# Patient Record
Sex: FEMALE | Race: BLACK OR AFRICAN AMERICAN | Employment: UNEMPLOYED | URBAN - METROPOLITAN AREA
[De-identification: names, ages, dates, MRNs, and addresses within clinical notes are randomized per-mention and may not be internally consistent; named-entity substitution may affect disease eponyms.]

---

## 2021-07-03 ENCOUNTER — HOSPITAL ENCOUNTER (EMERGENCY)
Age: 31
Discharge: HOME | End: 2021-07-03
Payer: MEDICAID

## 2021-07-03 VITALS
DIASTOLIC BLOOD PRESSURE: 82 MMHG | TEMPERATURE: 96.9 F | BODY MASS INDEX: 25.69 KG/M2 | WEIGHT: 145 LBS | HEART RATE: 74 BPM | OXYGEN SATURATION: 100 % | SYSTOLIC BLOOD PRESSURE: 118 MMHG | RESPIRATION RATE: 16 BRPM | HEIGHT: 63 IN

## 2021-07-03 DIAGNOSIS — Z02.89 ENCOUNTER TO OBTAIN EXCUSE FROM WORK: Primary | ICD-10-CM

## 2021-07-03 PROCEDURE — 99283 EMERGENCY DEPT VISIT LOW MDM: CPT

## 2021-07-03 NOTE — ED PROVIDER NOTES
[address] [facility]  Department of Emergency Medicine   ED  Encounter Note  Admit Date/RoomTime: 7/3/2021  2:03 PM  ED Room: SRAVAN/SRAVAN  NAME: Jackie Avilez  : 1990  MRN: [id number]     Chief Complaint:  Other (Diarrhea and nausea x 3 days after eating [employer]. Feels better today. Needs an excuse to return to work. )    [facility]        Jackie Avilez is a 31 y.o. female who presents to the ED by private vehicle for work excuse. Patient states she ate at [facility] on Wed night and Thursday morning she developed multiple episodes of watery non bloody diarrhea which improved on Friday and is drinking gatorade and coffee today with no further events. She wants to return to work tonight as a  and needs an excuse to return. She denies any fever, chills, abdominal pain or UTI symptoms. The complaint has been resolved and are mild in severity. ROS   Pertinent positives and negatives are stated within HPI, all other systems reviewed and are negative. Past Medical History:  has no past medical history on file. Surgical History:  has no past surgical history on file. Social History:  reports that she has never smoked. She does not have any smokeless tobacco history on file. She reports that she does not drink alcohol and does not use drugs. Family History: family history is not on file. Allergies: Patient has no known allergies. PHYSICAL EXAM   Oxygen Saturation Interpretation: Normal.        ED Triage Vitals [21 1322]   BP Temp Temp Source Pulse Resp SpO2 Height Weight   118/82 96.9 °F (36.1 °C) Infrared 74 16 100 % 5' 3\" (1.6 m) 145 lb (65.8 kg)         Physical Exam  Constitutional/General: Alert and oriented x3, well appearing, non toxic  HEENT:  NC/NT. PERRLA,  Airway patent.   Neck: Supple, full ROM, non tender to palpation in the midline, no stridor, no crepitus, no meningeal signs  Respiratory: Lungs clear to auscultation bilaterally, no wheezes, rales, or rhonchi. Not in respiratory distress  CV:  Regular rate. Regular rhythm. No murmurs, gallops, or rubs. 2+ distal pulses  Chest: No chest wall tenderness  GI:  Abdomen Soft, Non tender, Non distended. +BS. No rebound, guarding, or rigidity. No pulsatile masses. Musculoskeletal: Moves all extremities x 4. Warm and well perfused, no clubbing, cyanosis, or edema. Capillary refill <3 seconds  Integument: skin warm and dry. No rashes. Lymphatic: no lymphadenopathy noted  Neurologic: GCS 15, no focal deficits, symmetric strength 5/5 in the upper and lower extremities bilaterally  Psychiatric: Normal Affect    Lab / Imaging Results   (All laboratory and radiology results have been personally reviewed by myself)  Labs:  No results found for this visit on 07/03/21. Imaging: All Radiology results interpreted by Radiologist unless otherwise noted. No orders to display       ED Course / Medical Decision Making   Medications - No data to display     . Consult(s):   None    Procedure(s):   none    MDM:   This is a 48-year-old female who presents today and has no symptoms and wanting a work excuse to return back to work today because he called off work a few days ago after eating at [employer] and experienced some gastrointestinal symptoms which have completely resolved. She denies any URI symptoms. She is afebrile, nontoxic appearance, hemodynamically stable has a benign abdominal examination has no urinary complaints or vaginal concerns. Patient was advised on follow-up with her PCP as needed. Work excuse will be provided to return to work this evening. Plan of Care/Counseling:  EDDIE Foreman CNP reviewed today's visit with the patient in addition to providing specific details for the plan of care and counseling regarding the diagnosis and prognosis. Questions are answered at this time and are agreeable with the plan. ASSESSMENT     1.  Encounter to obtain excuse from work      PLAN   Discharged home. Patient condition is good    New Medications     Discharge Medication List as of 7/3/2021  3:00 PM        Electronically signed by EDDIE Winters CNP   DD: 7/3/21  **This report was transcribed using voice recognition software. Every effort was made to ensure accuracy; however, inadvertent computerized transcription errors may be present.   END OF ED PROVIDER NOTE      EDDIE Winters CNP  07/06/21 [id number]

## 2021-07-23 ENCOUNTER — APPOINTMENT (OUTPATIENT)
Dept: GENERAL RADIOLOGY | Age: 31
End: 2021-07-23
Payer: MEDICAID

## 2021-07-23 ENCOUNTER — APPOINTMENT (OUTPATIENT)
Dept: CT IMAGING | Age: 31
End: 2021-07-23
Payer: MEDICAID

## 2021-07-23 ENCOUNTER — HOSPITAL ENCOUNTER (EMERGENCY)
Age: 31
Discharge: HOME | End: 2021-07-23
Payer: MEDICAID

## 2021-07-23 VITALS
SYSTOLIC BLOOD PRESSURE: 136 MMHG | HEART RATE: 75 BPM | TEMPERATURE: 98.1 F | BODY MASS INDEX: 24.8 KG/M2 | OXYGEN SATURATION: 98 % | WEIGHT: 140 LBS | RESPIRATION RATE: 16 BRPM | DIASTOLIC BLOOD PRESSURE: 74 MMHG | HEIGHT: 63 IN

## 2021-07-23 DIAGNOSIS — T07.XXXA ABRASIONS OF MULTIPLE SITES: Primary | ICD-10-CM
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
## 2021-07-23 PROCEDURE — 73502 X-RAY EXAM HIP UNI 2-3 VIEWS: CPT

## 2021-07-23 PROCEDURE — 72100 X-RAY EXAM L-S SPINE 2/3 VWS: CPT

## 2021-07-23 PROCEDURE — 73630 X-RAY EXAM OF FOOT: CPT

## 2021-07-23 PROCEDURE — 99283 EMERGENCY DEPT VISIT LOW MDM: CPT

## 2021-07-23 PROCEDURE — 72125 CT NECK SPINE W/O DYE: CPT

## 2021-07-23 PROCEDURE — 70450 CT HEAD/BRAIN W/O DYE: CPT

## 2021-07-23 PROCEDURE — 73560 X-RAY EXAM OF KNEE 1 OR 2: CPT

## 2021-07-23 RX ORDER — BACITRACIN ZINC AND POLYMYXIN B SULFATE 500; 1000 [USP'U]/G; [USP'U]/G
OINTMENT TOPICAL
Qty: 30 G | Refills: 0 | Status: SHIPPED | OUTPATIENT
Start: 2021-07-23 | End: 2021-07-30

## 2021-07-23 RX ORDER — IBUPROFEN 600 MG/1
600 TABLET ORAL ONCE
Status: DISCONTINUED | OUTPATIENT
Start: 2021-07-23 | End: 2021-07-23 | Stop reason: HOSPADM

## 2021-07-23 RX ORDER — DIAPER,BRIEF,INFANT-TODD,DISP
EACH MISCELLANEOUS
Status: DISCONTINUED
Start: 2021-07-23 | End: 2021-07-23 | Stop reason: HOSPADM

## 2021-07-23 RX ORDER — IBUPROFEN 800 MG/1
800 TABLET ORAL EVERY 6 HOURS PRN
Qty: 30 TABLET | Refills: 0 | Status: SHIPPED | OUTPATIENT
Start: 2021-07-23 | End: 2022-01-04 | Stop reason: SDUPTHER

## 2021-07-23 RX ORDER — DIAPER,BRIEF,INFANT-TODD,DISP
EACH MISCELLANEOUS ONCE
Status: DISCONTINUED | OUTPATIENT
Start: 2021-07-23 | End: 2021-07-23 | Stop reason: HOSPADM

## 2021-07-23 RX ORDER — CEPHALEXIN 500 MG/1
500 CAPSULE ORAL 3 TIMES DAILY
Qty: 21 CAPSULE | Refills: 0 | Status: SHIPPED | OUTPATIENT
Start: 2021-07-23 | End: 2021-07-30

## 2021-07-23 ASSESSMENT — PAIN SCALES - GENERAL: PAINLEVEL_OUTOF10: 7

## 2021-07-23 ASSESSMENT — PAIN DESCRIPTION - FREQUENCY: FREQUENCY: CONTINUOUS

## 2021-07-23 ASSESSMENT — PAIN DESCRIPTION - DESCRIPTORS: DESCRIPTORS: BURNING

## 2021-07-23 NOTE — ED NOTES
Radiology Procedure Waiver   Name: Lucinda Murphy  : 1990  MRN: [id number]    Date:  21    Time: 7:12 PM EDT    Benefits of immediately proceeding with Radiology exam(s) without pre-testing outweigh the risks or are not indicated as specified below and therefore the following is/are being waived:    [x] Pregnancy test   [] Patients LMP on-time and regular. [x] Patient had Tubal Ligation or has other Contraception Device. [] Patient  is Menopausal or Premenarcheal.    [] Patient had Full or Partial Hysterectomy. [] Protocol for Iodine allergy    [] MRI Questionnaire     [] BUN/Creatinine   [] Patient age w/no hx of renal dysfunction. [] Patient on Dialysis. [] Recent Normal Labs.   Electronically signed by EDDIE Steeel CNP on 21 at 7:12 PM EDT             EDDIE Freed CNP  21

## 2021-07-24 NOTE — ED NOTES
Discharged  To follow with pmd.    rx x 3 escribed to [facility]  mahoning/magui Mohan RN  07/23/21 [id number]
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
## 2022-01-04 ENCOUNTER — OFFICE VISIT (OUTPATIENT)
Dept: PRIMARY CARE CLINIC | Age: 32
End: 2022-01-04
Payer: MEDICAID

## 2022-01-04 VITALS
TEMPERATURE: 97.8 F | SYSTOLIC BLOOD PRESSURE: 112 MMHG | BODY MASS INDEX: 23.92 KG/M2 | HEIGHT: 63 IN | HEART RATE: 88 BPM | OXYGEN SATURATION: 93 % | WEIGHT: 135 LBS | DIASTOLIC BLOOD PRESSURE: 64 MMHG

## 2022-01-04 DIAGNOSIS — J02.0 STREP THROAT: ICD-10-CM

## 2022-01-04 DIAGNOSIS — J02.9 PHARYNGITIS, UNSPECIFIED ETIOLOGY: ICD-10-CM

## 2022-01-04 DIAGNOSIS — R52 BODY ACHES: ICD-10-CM

## 2022-01-04 DIAGNOSIS — J01.40 ACUTE NON-RECURRENT PANSINUSITIS: Primary | ICD-10-CM

## 2022-01-04 LAB
Lab: NORMAL
PERFORMING INSTRUMENT: NORMAL
QC PASS/FAIL: NORMAL
S PYO AG THROAT QL: POSITIVE
SARS-COV-2, POC: NORMAL

## 2022-01-04 PROCEDURE — 87880 STREP A ASSAY W/OPTIC: CPT | Performed by: NURSE PRACTITIONER

## 2022-01-04 PROCEDURE — 99213 OFFICE O/P EST LOW 20 MIN: CPT | Performed by: NURSE PRACTITIONER

## 2022-01-04 PROCEDURE — G8427 DOCREV CUR MEDS BY ELIG CLIN: HCPCS | Performed by: NURSE PRACTITIONER

## 2022-01-04 PROCEDURE — 87426 SARSCOV CORONAVIRUS AG IA: CPT | Performed by: NURSE PRACTITIONER

## 2022-01-04 PROCEDURE — G8420 CALC BMI NORM PARAMETERS: HCPCS | Performed by: NURSE PRACTITIONER

## 2022-01-04 PROCEDURE — G8484 FLU IMMUNIZE NO ADMIN: HCPCS | Performed by: NURSE PRACTITIONER

## 2022-01-04 PROCEDURE — 1036F TOBACCO NON-USER: CPT | Performed by: NURSE PRACTITIONER

## 2022-01-04 RX ORDER — IBUPROFEN 800 MG/1
800 TABLET ORAL EVERY 6 HOURS PRN
Qty: 30 TABLET | Refills: 0 | Status: SHIPPED | OUTPATIENT
Start: 2022-01-04

## 2022-01-04 RX ORDER — AMOXICILLIN AND CLAVULANATE POTASSIUM 875; 125 MG/1; MG/1
1 TABLET, FILM COATED ORAL 2 TIMES DAILY
Qty: 20 TABLET | Refills: 0 | Status: SHIPPED | OUTPATIENT
Start: 2022-01-04 | End: 2022-01-14

## 2022-01-04 NOTE — PROGRESS NOTES
Chief Complaint   Generalized Body Aches (x 1 wk), Pharyngitis, Nasal Congestion, and Headache      History of Present Illness   Source of history provided by:  patientRohit Garcia is a 27 y.o. old female who has a past medical history of:   Past Medical History:   Diagnosis Date    Depression     Presents to the flu clinic with complaints of Generalized Body Aches, Headache, Pharyngitis, Rhinorrhea, Nasal Congestion, dry Cough and Chest congestion x 10 days. States symptoms have stayed the same since onset. Has been taking none without symptomatic relief. Denies any Fever, Shortness of breath, Nausea, Vomiting or Chest Pain. Denies any hx of no history of pneumonia or bronchitis. ROS   Pertinent positives and negatives are stated within HPI, all other systems reviewed and are negative. Surgical History:  has no past surgical history on file. Social History:  reports that she has never smoked. She has never used smokeless tobacco. She reports that she does not drink alcohol and does not use drugs. Family History: family history is not on file. Allergies: Patient has no known allergies. Physical Exam      VS:  /64   Pulse 88   Temp 97.8 °F (36.6 °C)   Ht 5' 3\" (1.6 m)   Wt 135 lb (61.2 kg)   SpO2 93%   BMI 23.91 kg/m²    Oxygen Saturation Interpretation: Normal.    Constitutional:  Alert, development consistent with age. NAD. Head:  NC/NT. Airway patent. Ears: TMs wnl bilaterally. Canals without exudate or swelling bilaterally. Mouth: Posterior pharynx with mild erythema and clear postnasal drip. no tonsillar hypertrophy or exudate. Neck:  Normal ROM. Supple. no anterior cervical adenopathy noted. Lungs: CTAB without wheezes, rales, or rhonchi. CV:  Regular rate and rhythm, normal heart sounds, without pathological murmurs, ectopy, gallops, or rubs. Skin:  Normal turgor. Warm, dry, without visible rash. Lymphatic: No lymphangitis or adenopathy noted.   Neurological: Oriented. Motor functions intact. Lab / Imaging Results   (All laboratory and radiology results have been personally reviewed by myself)  Labs:  Results for orders placed or performed in visit on 01/04/22   POCT COVID-19, Antigen   Result Value Ref Range    SARS-COV-2, POC Not-Detected Not Detected    Lot Number [id number]     QC Pass/Fail pass     Performing Instrument BD Veritor    POCT rapid strep A   Result Value Ref Range    Strep A Ag Positive (A) None Detected       Imaging: All Radiology results interpreted by Radiologist unless otherwise noted. No results found. Medical Decision Making   Pt non-toxic, in no apparent distress and stable at time of discharge. Assessment/Plan   Sarahi was seen today for generalized body aches, pharyngitis, nasal congestion and headache. Diagnoses and all orders for this visit:    Acute non-recurrent pansinusitis  -     amoxicillin-clavulanate (AUGMENTIN) 875-125 MG per tablet; Take 1 tablet by mouth 2 times daily for 10 days  -     ibuprofen (ADVIL;MOTRIN) 800 MG tablet; Take 1 tablet by mouth every 6 hours as needed for Pain    Strep throat  -     amoxicillin-clavulanate (AUGMENTIN) 875-125 MG per tablet; Take 1 tablet by mouth 2 times daily for 10 days  -     ibuprofen (ADVIL;MOTRIN) 800 MG tablet; Take 1 tablet by mouth every 6 hours as needed for Pain    Body aches  -     POCT COVID-19, Antigen    Pharyngitis, unspecified etiology  -     POCT rapid strep A      . Increase fluids and rest. Symptomatic relief discussed including Tylenol prn pain/fever. Schedule f/u with PCP in 7-10 days if symptoms persist. ED sooner if symptoms worsen or change. ED immediately with high or refractory fever, progressive SOB, dyspnea, CP, calf pain/swelling, shaking chills, vomiting, abdominal pain, lethargy, flank pain, or decreased urinary output. Pt verbalizes understanding and is in agreement with plan of care. All questions answered.     Radha Vazquez, APRN - CNP    This visit was provided as a focused evaluation during the COVID -19 pandemic/national emergency. A comprehensive review of all previous patient history and testing was not conducted. Pertinent findings were elicited during the visit.

## 2022-01-04 NOTE — PATIENT INSTRUCTIONS
Patient advised to dispose of toothbrush after 24 hours of antibiotic therapy. New toothbrush to be used during duration of antibiotics. Change toothbrush again once antibiotics are complete. No sharing drinks, cups, utensils. Patient aware that they are contagious for up to 24 hours after the start of antibiotics. Patient Education        Strep Throat: Care Instructions  Your Care Instructions     Strep throat is a bacterial infection that causes sudden, severe sore throat and fever. Strep throat, which is caused by bacteria called streptococcus, is treated with antibiotics. Sometimes a strep test is necessary to tell if the sore throat is caused by strep bacteria. Treatment can help ease symptoms and may prevent future problems. Follow-up care is a key part of your treatment and safety. Be sure to make and go to all appointments, and call your doctor if you are having problems. It's also a good idea to know your test results and keep a list of the medicines you take. How can you care for yourself at home? · Take your antibiotics as directed. Do not stop taking them just because you feel better. You need to take the full course of antibiotics. · Strep throat can spread to others until 24 hours after you begin taking antibiotics. During this time, avoid contact with other people at work, school, or home, especially infants and children. Do not sneeze or cough on others, and wash your hands often. Keep your drinking glass and eating utensils separate from those of others. Wash these items well in hot, soapy water. · Gargle with warm salt water at least once each hour to help reduce swelling and make your throat feel better. Use 1 teaspoon of salt mixed in 8 fluid ounces of warm water. · Take an over-the-counter pain medication, such as acetaminophen (Tylenol), ibuprofen (Advil, Motrin), or naproxen (Aleve). Read and follow all instructions on the label.   · Try an over-the-counter anesthetic throat spray or throat lozenges, which may help relieve throat pain. · Drink plenty of fluids. Fluids may help soothe an irritated throat. Hot fluids, such as tea or soup, may help your throat feel better. · Eat soft solids and drink plenty of clear liquids. Flavored ice pops, ice cream, scrambled eggs, sherbet, and gelatin dessert (such as Jell-O) may also soothe the throat. · Get lots of rest.  · Do not smoke, and avoid secondhand smoke. If you need help quitting, talk to your doctor about stop-smoking programs and medicines. These can increase your chances of quitting for good. · Use a vaporizer or humidifier to add moisture to the air in your bedroom. Follow the directions for cleaning the machine. When should you call for help? Call your doctor now or seek immediate medical care if:    · You have a new or higher fever.     · You have a fever with a stiff neck or severe headache.     · You have new or worse trouble swallowing.     · Your sore throat gets much worse on one side.     · Your pain becomes much worse on one side of your throat. Watch closely for changes in your health, and be sure to contact your doctor if:    · You are not getting better after 2 days (48 hours).     · You do not get better as expected. Where can you learn more? Go to [email or web address]. org and sign in to your [employer] account. Enter K625 in the [facility] box to learn more about \"Strep Throat: Care Instructions. \"     If you do not have an account, please click on the \"Sign Up Now\" link. Current as of: September 8, 2021               Content Version: 13.1  © 3796-3566 [employer], [employer] Care instructions adapted under license by [facility] ([facility]). If you have questions about a medical condition or this instruction, always ask your healthcare professional. [address] any warranty or liability for your use of this information.

## 2022-04-03 DIAGNOSIS — J01.40 ACUTE NON-RECURRENT PANSINUSITIS: ICD-10-CM

## 2022-04-03 DIAGNOSIS — J02.0 STREP THROAT: ICD-10-CM

## 2022-04-04 RX ORDER — IBUPROFEN 800 MG/1
800 TABLET ORAL EVERY 6 HOURS PRN
Qty: 30 TABLET | Refills: 0 | OUTPATIENT
Start: 2022-04-04

## 2022-09-17 ENCOUNTER — HOSPITAL ENCOUNTER (EMERGENCY)
Age: 32
Discharge: HOME | End: 2022-09-17
Attending: EMERGENCY MEDICINE
Payer: MEDICAID

## 2022-09-17 VITALS
OXYGEN SATURATION: 98 % | HEART RATE: 86 BPM | WEIGHT: 134 LBS | RESPIRATION RATE: 16 BRPM | SYSTOLIC BLOOD PRESSURE: 110 MMHG | TEMPERATURE: 98.4 F | DIASTOLIC BLOOD PRESSURE: 74 MMHG | BODY MASS INDEX: 23.74 KG/M2

## 2022-09-17 DIAGNOSIS — G43.009 MIGRAINE WITHOUT AURA AND WITHOUT STATUS MIGRAINOSUS, NOT INTRACTABLE: ICD-10-CM

## 2022-09-17 DIAGNOSIS — J01.00 ACUTE MAXILLARY SINUSITIS, RECURRENCE NOT SPECIFIED: Primary | ICD-10-CM

## 2022-09-17 LAB
HCG QUALITATIVE: NEGATIVE
INFLUENZA A: NOT DETECTED
INFLUENZA B: NOT DETECTED
SARS-COV-2 RNA, RT PCR: NOT DETECTED
STREP GRP A PCR: NEGATIVE

## 2022-09-17 PROCEDURE — 87636 SARSCOV2 & INF A&B AMP PRB: CPT

## 2022-09-17 PROCEDURE — 87880 STREP A ASSAY W/OPTIC: CPT

## 2022-09-17 PROCEDURE — 99284 EMERGENCY DEPT VISIT MOD MDM: CPT

## 2022-09-17 PROCEDURE — 84703 CHORIONIC GONADOTROPIN ASSAY: CPT

## 2022-09-17 PROCEDURE — 2580000003 HC RX 258: Performed by: EMERGENCY MEDICINE

## 2022-09-17 PROCEDURE — 96374 THER/PROPH/DIAG INJ IV PUSH: CPT

## 2022-09-17 PROCEDURE — 36415 COLL VENOUS BLD VENIPUNCTURE: CPT

## 2022-09-17 PROCEDURE — 96375 TX/PRO/DX INJ NEW DRUG ADDON: CPT

## 2022-09-17 PROCEDURE — 6360000002 HC RX W HCPCS: Performed by: EMERGENCY MEDICINE

## 2022-09-17 RX ORDER — METOCLOPRAMIDE HYDROCHLORIDE 5 MG/ML
10 INJECTION INTRAMUSCULAR; INTRAVENOUS ONCE
Status: COMPLETED | OUTPATIENT
Start: 2022-09-17 | End: 2022-09-17

## 2022-09-17 RX ORDER — DOXYCYCLINE HYCLATE 100 MG
100 TABLET ORAL 2 TIMES DAILY
Qty: 20 TABLET | Refills: 0 | Status: SHIPPED | OUTPATIENT
Start: 2022-09-17 | End: 2022-09-27

## 2022-09-17 RX ORDER — DIPHENHYDRAMINE HYDROCHLORIDE 50 MG/ML
50 INJECTION INTRAMUSCULAR; INTRAVENOUS ONCE
Status: COMPLETED | OUTPATIENT
Start: 2022-09-17 | End: 2022-09-17

## 2022-09-17 RX ORDER — LORATADINE AND PSEUDOEPHEDRINE SULFATE 5; 120 MG/1; MG/1
1 TABLET, EXTENDED RELEASE ORAL 2 TIMES DAILY
Qty: 20 TABLET | Refills: 0 | Status: SHIPPED | OUTPATIENT
Start: 2022-09-17

## 2022-09-17 RX ORDER — LORAZEPAM 2 MG/ML
1 INJECTION INTRAMUSCULAR ONCE
Status: COMPLETED | OUTPATIENT
Start: 2022-09-17 | End: 2022-09-17

## 2022-09-17 RX ORDER — 0.9 % SODIUM CHLORIDE 0.9 %
1000 INTRAVENOUS SOLUTION INTRAVENOUS ONCE
Status: COMPLETED | OUTPATIENT
Start: 2022-09-17 | End: 2022-09-17

## 2022-09-17 RX ORDER — BUTALBITAL, ACETAMINOPHEN AND CAFFEINE 300; 40; 50 MG/1; MG/1; MG/1
1 CAPSULE ORAL EVERY 6 HOURS PRN
Qty: 15 CAPSULE | Refills: 1 | Status: SHIPPED | OUTPATIENT
Start: 2022-09-17

## 2022-09-17 RX ADMIN — SODIUM CHLORIDE 1000 ML: 9 INJECTION, SOLUTION INTRAVENOUS at 19:38

## 2022-09-17 RX ADMIN — LORAZEPAM 1 MG: 2 INJECTION INTRAMUSCULAR; INTRAVENOUS at 19:41

## 2022-09-17 RX ADMIN — METOCLOPRAMIDE 10 MG: 5 INJECTION, SOLUTION INTRAMUSCULAR; INTRAVENOUS at 19:42

## 2022-09-17 RX ADMIN — DIPHENHYDRAMINE HYDROCHLORIDE 50 MG: 50 INJECTION, SOLUTION INTRAMUSCULAR; INTRAVENOUS at 19:40

## 2022-09-17 ASSESSMENT — PAIN DESCRIPTION - FREQUENCY: FREQUENCY: CONTINUOUS

## 2022-09-17 ASSESSMENT — PAIN DESCRIPTION - PAIN TYPE: TYPE: ACUTE PAIN

## 2022-09-17 ASSESSMENT — PAIN DESCRIPTION - ONSET: ONSET: ON-GOING

## 2022-09-17 ASSESSMENT — PAIN SCALES - GENERAL: PAINLEVEL_OUTOF10: 9

## 2022-09-17 ASSESSMENT — PAIN DESCRIPTION - DESCRIPTORS: DESCRIPTORS: SORE

## 2022-09-17 ASSESSMENT — PAIN - FUNCTIONAL ASSESSMENT
PAIN_FUNCTIONAL_ASSESSMENT: 0-10
PAIN_FUNCTIONAL_ASSESSMENT: ACTIVITIES ARE NOT PREVENTED

## 2022-09-17 ASSESSMENT — PAIN DESCRIPTION - LOCATION: LOCATION: THROAT

## 2022-09-17 NOTE — ED PROVIDER NOTES
HPI:  9/17/22, Time: 6:28 PM EDT        Nida Roberson is a 27 y.o. female presenting to the ED for body aches sore throat and headache similar to her prior migraine headaches, beginning 3 days ago. The complaint has been persistent, moderate in severity, and worsened by nothing. Patient denies any known infectious exposures. She is concerned about the possibility of COVID-19 exposure however. No neck stiffness, no rashes, no vision loss, no focal extremity weakness is reported. No relieving factors. Patient drove her self here for evaluation, but states she will get someone to pick her up. .    Review of Systems:   A complete review of systems was performed and pertinent positives and negatives are stated within HPI, all other systems reviewed and are negative.    --------------------------------------------- PAST HISTORY ---------------------------------------------  Past Medical History:  has a past medical history of Depression. Past Surgical History:  has no past surgical history on file. Social History:  reports that she has never smoked. She has never used smokeless tobacco. She reports that she does not drink alcohol and does not use drugs. Patient does vape e-cigarettes    Family History: family history is not on file. The patients home medications have been reviewed. Allergies: Patient has no known allergies.     -------------------------------------------------- RESULTS -------------------------------------------------  All laboratory and radiology results have been personally reviewed by myself   LABS:  Results for orders placed or performed during the hospital encounter of 09/17/22   Strep Screen Group A Throat    Specimen: Throat   Result Value Ref Range    Strep Grp A PCR Negative Negative   COVID-19 & Influenza Combo    Specimen: Nasopharyngeal Swab   Result Value Ref Range    SARS-CoV-2 RNA, RT PCR NOT DETECTED NOT DETECTED    INFLUENZA A NOT DETECTED NOT DETECTED INFLUENZA B NOT DETECTED NOT DETECTED   HCG Qualitative, Serum   Result Value Ref Range    hCG Qual NEGATIVE NEGATIVE       RADIOLOGY:  Interpreted by Radiologist.  No orders to display       ------------------------- NURSING NOTES AND VITALS REVIEWED ---------------------------   The nursing notes within the ED encounter and vital signs as below have been reviewed. /74   Pulse 86   Temp 98.4 °F (36.9 °C)   Resp 16   Wt 134 lb (60.8 kg)   LMP  (LMP Unknown) Comment: depo  SpO2 98%   BMI 23.74 kg/m²   Oxygen Saturation Interpretation: Normal      ---------------------------------------------------PHYSICAL EXAM--------------------------------------      Constitutional/General: Alert and oriented x3, well appearing, non toxic in moderate distress  Head: Normocephalic and atraumatic  Eyes: PERRL, EOMI, no scleral icterus, otherwise normal  Mouth: Oropharynx clear, handling secretions, no trismus  Neck: Supple, full ROM, no nuchal rigidity, otherwise normal  Pulmonary: Lungs clear to auscultation bilaterally, no wheezes, rales, or rhonchi. Not in respiratory distress  Cardiovascular:  Regular rate and rhythm, no murmurs, gallops, or rubs. 2+ distal pulses  GI: Soft, non tender, non distended,   Extremities: Moves all extremities x 4. Warm and well perfused  Skin: warm and dry without rash; no petechia no purpura no target lesions no bullae  Neurologic: GCS 15, cranial nerves II through XII grossly intact with no focal deficits.   No meningeal signs  Psych: Normal Affect      ------------------------------ ED COURSE/MEDICAL DECISION MAKING----------------------  Medications   diphenhydrAMINE (BENADRYL) injection 50 mg (50 mg IntraVENous Given 9/17/22 1940)   metoclopramide (REGLAN) injection 10 mg (10 mg IntraVENous Given 9/17/22 1942)   0.9 % sodium chloride bolus (1,000 mLs IntraVENous New Bag 9/17/22 1938)   LORazepam (ATIVAN) injection 1 mg (1 mg IntraVENous Given 9/17/22 1941)       ED COURSE: Medical Decision Making:   Clinical presentation suggestive of acute exacerbation of common migraine headache. Patient's COVID-19 screen was negative as was her influenza screen. Patient has c/o  sinus congestion with frontal and maxillary sinus headache suspicious for acute maxillary sinusitis will be treated with antibiotics on this basis even though her rapid strep screen was negative. Shared decision making was employed and as patient does vape e-cigarettes she is felt to have significant risk factors and clinical presentation to warrant antibiotics for treatment. Patient agrees with this. Counseling: The emergency provider has spoken with the patient and discussed todays results, in addition to providing specific details for the plan of care and counseling regarding the diagnosis and prognosis. Questions are answered at this time and they are agreeable with the plan.      --------------------------------- IMPRESSION AND DISPOSITION ---------------------------------    IMPRESSION  1. Acute maxillary sinusitis, recurrence not specified    2. Migraine without aura and without status migrainosus, not intractable        DISPOSITION  Disposition: Discharge to home  Patient condition is stable      NOTE: This report was transcribed using voice recognition software.  Every effort was made to ensure accuracy; however, inadvertent computerized transcription errors may be present        Jesús Wilburn MD  09/27/22 0485

## 2022-09-17 NOTE — Clinical Note
Frannie Patterson was seen and treated in our emergency department on 9/17/2022. She may return to work on 09/19/2022. NOTE:  Please excuse Ms. Florin Don from work duties due to illness Saturday 9/17/2022 through Monday, 9/19/2022     If you have any questions or concerns, please don't hesitate to call.       Judith Vizcarra MD

## 2022-09-18 NOTE — DISCHARGE INSTRUCTIONS
NOTE:  Your Strep/Influenza/COVID-19 tests were all negative/normal.  You are being treated for suspected sinus infection with antibiotics (doxycycline) due to your increased risk of this condition (because of vaping). Get IMMEDIATE medical attention if any new/worsening symptoms occur!

## 2023-03-17 ENCOUNTER — HOSPITAL ENCOUNTER (EMERGENCY)
Age: 33
Discharge: HOME | End: 2023-03-17
Attending: EMERGENCY MEDICINE
Payer: MEDICAID

## 2023-03-17 ENCOUNTER — APPOINTMENT (OUTPATIENT)
Dept: GENERAL RADIOLOGY | Age: 33
End: 2023-03-17
Payer: MEDICAID

## 2023-03-17 VITALS
HEART RATE: 84 BPM | OXYGEN SATURATION: 91 % | SYSTOLIC BLOOD PRESSURE: 133 MMHG | RESPIRATION RATE: 20 BRPM | DIASTOLIC BLOOD PRESSURE: 95 MMHG | TEMPERATURE: 98.2 F

## 2023-03-17 DIAGNOSIS — J02.9 VIRAL PHARYNGITIS: Primary | ICD-10-CM

## 2023-03-17 DIAGNOSIS — J06.9 VIRAL URI WITH COUGH: ICD-10-CM

## 2023-03-17 LAB
INFLUENZA A: NOT DETECTED
INFLUENZA B: NOT DETECTED
SARS-COV-2 RNA, RT PCR: NOT DETECTED
STREP GRP A PCR: NEGATIVE

## 2023-03-17 PROCEDURE — 71046 X-RAY EXAM CHEST 2 VIEWS: CPT

## 2023-03-17 PROCEDURE — 99284 EMERGENCY DEPT VISIT MOD MDM: CPT

## 2023-03-17 PROCEDURE — 87636 SARSCOV2 & INF A&B AMP PRB: CPT

## 2023-03-17 PROCEDURE — 6370000000 HC RX 637 (ALT 250 FOR IP): Performed by: EMERGENCY MEDICINE

## 2023-03-17 PROCEDURE — 87880 STREP A ASSAY W/OPTIC: CPT

## 2023-03-17 RX ORDER — ALBUTEROL SULFATE 90 UG/1
2 AEROSOL, METERED RESPIRATORY (INHALATION) EVERY 4 HOURS PRN
Qty: 18 G | Refills: 0 | Status: SHIPPED | OUTPATIENT
Start: 2023-03-17 | End: 2024-03-16

## 2023-03-17 RX ORDER — PREDNISONE 20 MG/1
20 TABLET ORAL 2 TIMES DAILY
Qty: 10 TABLET | Refills: 0 | Status: SHIPPED | OUTPATIENT
Start: 2023-03-17 | End: 2023-03-22

## 2023-03-17 RX ORDER — IPRATROPIUM BROMIDE AND ALBUTEROL SULFATE 2.5; .5 MG/3ML; MG/3ML
1 SOLUTION RESPIRATORY (INHALATION) ONCE
Status: COMPLETED | OUTPATIENT
Start: 2023-03-17 | End: 2023-03-17

## 2023-03-17 RX ORDER — NAPROXEN 500 MG/1
500 TABLET ORAL 2 TIMES DAILY PRN
Qty: 14 TABLET | Refills: 0 | Status: SHIPPED | OUTPATIENT
Start: 2023-03-17 | End: 2023-03-24

## 2023-03-17 RX ORDER — LORATADINE 10 MG
1 CAPSULE ORAL 2 TIMES DAILY PRN
Qty: 30 CAPSULE | Refills: 0 | Status: SHIPPED | OUTPATIENT
Start: 2023-03-17

## 2023-03-17 RX ORDER — GUAIFENESIN/DEXTROMETHORPHAN 100-10MG/5
10 SYRUP ORAL ONCE
Status: COMPLETED | OUTPATIENT
Start: 2023-03-17 | End: 2023-03-17

## 2023-03-17 RX ADMIN — IPRATROPIUM BROMIDE AND ALBUTEROL SULFATE 1 AMPULE: 2.5; .5 SOLUTION RESPIRATORY (INHALATION) at 01:27

## 2023-03-17 RX ADMIN — GUAIFENESIN AND DEXTROMETHORPHAN 10 ML: 100; 10 SYRUP ORAL at 01:27

## 2023-03-17 ASSESSMENT — LIFESTYLE VARIABLES: HOW OFTEN DO YOU HAVE A DRINK CONTAINING ALCOHOL: MONTHLY OR LESS

## 2023-03-17 NOTE — ED PROVIDER NOTES
HPI:  3/17/23, Time: 1:23 AM EDT        Moraima Child is a 34 y.o. female presenting to the ED for sore throat plus cough /congestion, beginning 1 day ago. The complaint has been persistent, moderate in severity, and worsened by nothing. Patient had any fevers chills, no reported colored sputum production no hemoptysis, no chest pain reported. No relieving factors. Review of Systems:   A complete review of systems was performed and pertinent positives and negatives are stated within HPI, all other systems reviewed and are negative.    --------------------------------------------- PAST HISTORY ---------------------------------------------  Past Medical History:  has a past medical history of Depression. Past Surgical History:  has no past surgical history on file. Social History:  reports that she has never smoked. She has never used smokeless tobacco. She reports current alcohol use. She reports that she does not use drugs. Family History: family history is not on file. The patients home medications have been reviewed. Allergies: Patient has no known allergies. -------------------------------------------------- RESULTS -------------------------------------------------  All laboratory and radiology results have been personally reviewed by myself   LABS:  Results for orders placed or performed during the hospital encounter of 03/17/23   COVID-19 & Influenza Combo    Specimen: Nasopharyngeal Swab   Result Value Ref Range    SARS-CoV-2 RNA, RT PCR NOT DETECTED NOT DETECTED    INFLUENZA A NOT DETECTED NOT DETECTED    INFLUENZA B NOT DETECTED NOT DETECTED   Strep Screen Group A Throat    Specimen: Throat   Result Value Ref Range    Strep Grp A PCR Negative Negative       RADIOLOGY:  Interpreted by Radiologist.  XR CHEST (2 VW)   Final Result   No acute disease. RECOMMENDATION:   Careful clinical correlation and follow up recommended.              ------------------------- NURSING NOTES AND VITALS REVIEWED ---------------------------  The nursing notes within the ED encounter and vital signs as below have been reviewed. BP (!) 133/95   Pulse 84   Temp 98.2 °F (36.8 °C) (Oral)   Resp 20   SpO2 91%   Oxygen Saturation Interpretation: Normal      ---------------------------------------------------PHYSICAL EXAM--------------------------------------      Constitutional/General: Alert and oriented x3, well appearing, non toxic in mild distress  Head: Normocephalic and atraumatic  Eyes: PERRL, EOMI  Mouth: Oropharynx clear, handling secretions, no trismus; no peritonsillar edema no uvular shift nor any findings to suggest peritonsillar abscess no exudates. Neck: Supple, full ROM, no JVD no suprasternal retractions  Pulmonary: Lungs clear to auscultation bilaterally, no wheezes, rales, or rhonchi. Not in respiratory distress  Cardiovascular:  Regular rate and rhythm, no murmurs, gallops, or rubs. 2+ distal pulses  Abdomen: Soft, non tender, non distended, no organomegaly otherwise normal  Extremities: Moves all extremities x 4. Warm and well perfused  Skin: warm and dry without rash  Neurologic: GCS 15, cranial nerves grossly intact  Psych: Normal Affect      ------------------------------ ED COURSE/MEDICAL DECISION MAKING----------------------  Medications   ipratropium-albuterol (DUONEB) nebulizer solution 1 ampule (1 ampule Inhalation Given 3/17/23 0127)   guaiFENesin-dextromethorphan (ROBITUSSIN DM) 100-10 MG/5ML syrup 10 mL (10 mLs Oral Given 3/17/23 0127)       ED COURSE:     Medical Decision Making:   Viral versus bacterial pharyngitis, maxillary sinusitis, pneumonia, COVID-19, to name a few. Patient's rapid strep screen was negative. COVID-19/influenza screening test were negative/normal.  Chest x-ray showed no focal infiltrates and her symptoms are felt to be a viral etiology. Antibiotics are not felt to be advisable.   I did discuss this with the patient and she is in agreement with the plan of care    Counseling: The emergency provider has spoken with the patient and discussed todays results, in addition to providing specific details for the plan of care and counseling regarding the diagnosis and prognosis. Questions are answered at this time and they are agreeable with the plan.  --------------------------------- IMPRESSION AND DISPOSITION ---------------------------------    IMPRESSION  1. Viral pharyngitis    2. Viral URI with cough        DISPOSITION  Disposition: Discharge to home  Patient condition is stable      NOTE: This report was transcribed using voice recognition software.  Every effort was made to ensure accuracy; however, inadvertent computerized transcription errors may be present         Siva Pierce MD  03/17/23 [address] Lisset Lawson MD  03/17/23 0300

## 2023-03-17 NOTE — Clinical Note
Gene Stephens was seen and treated in our emergency department on 3/17/2023. She may return to work on 03/20/2023. If you have any questions or concerns, please don't hesitate to call.       Belgica Ross MD

## 2023-04-29 ENCOUNTER — HOSPITAL ENCOUNTER (EMERGENCY)
Age: 33
Discharge: HOME | End: 2023-04-29
Attending: EMERGENCY MEDICINE
Payer: MEDICAID

## 2023-04-29 ENCOUNTER — APPOINTMENT (OUTPATIENT)
Dept: GENERAL RADIOLOGY | Age: 33
End: 2023-04-29
Payer: MEDICAID

## 2023-04-29 VITALS
HEART RATE: 87 BPM | SYSTOLIC BLOOD PRESSURE: 121 MMHG | TEMPERATURE: 98.4 F | OXYGEN SATURATION: 99 % | DIASTOLIC BLOOD PRESSURE: 81 MMHG | RESPIRATION RATE: 16 BRPM

## 2023-04-29 DIAGNOSIS — S83.421A SPRAIN OF LATERAL COLLATERAL LIGAMENT OF RIGHT KNEE, INITIAL ENCOUNTER: Primary | ICD-10-CM

## 2023-04-29 DIAGNOSIS — M25.561 ACUTE PAIN OF RIGHT KNEE: ICD-10-CM

## 2023-04-29 PROCEDURE — 96372 THER/PROPH/DIAG INJ SC/IM: CPT

## 2023-04-29 PROCEDURE — 6360000002 HC RX W HCPCS

## 2023-04-29 PROCEDURE — 90714 TD VACC NO PRESV 7 YRS+ IM: CPT

## 2023-04-29 PROCEDURE — 99284 EMERGENCY DEPT VISIT MOD MDM: CPT

## 2023-04-29 PROCEDURE — 90471 IMMUNIZATION ADMIN: CPT

## 2023-04-29 PROCEDURE — 73562 X-RAY EXAM OF KNEE 3: CPT

## 2023-04-29 RX ORDER — TETANUS AND DIPHTHERIA TOXOIDS ADSORBED 2; 2 [LF]/.5ML; [LF]/.5ML
0.5 INJECTION INTRAMUSCULAR ONCE
Status: COMPLETED | OUTPATIENT
Start: 2023-04-29 | End: 2023-04-29

## 2023-04-29 RX ORDER — KETOROLAC TROMETHAMINE 30 MG/ML
30 INJECTION, SOLUTION INTRAMUSCULAR; INTRAVENOUS ONCE
Status: COMPLETED | OUTPATIENT
Start: 2023-04-29 | End: 2023-04-29

## 2023-04-29 RX ORDER — IBUPROFEN 600 MG/1
600 TABLET ORAL 4 TIMES DAILY PRN
Qty: 40 TABLET | Refills: 0 | Status: SHIPPED | OUTPATIENT
Start: 2023-04-29

## 2023-04-29 RX ADMIN — TETANUS AND DIPHTHERIA TOXOIDS ADSORBED 0.5 ML: 2; 2 INJECTION INTRAMUSCULAR at 13:46

## 2023-04-29 RX ADMIN — KETOROLAC TROMETHAMINE 30 MG: 30 INJECTION, SOLUTION INTRAMUSCULAR at 13:47

## 2023-04-29 ASSESSMENT — PAIN DESCRIPTION - FREQUENCY: FREQUENCY: CONTINUOUS

## 2023-04-29 ASSESSMENT — LIFESTYLE VARIABLES
HOW OFTEN DO YOU HAVE A DRINK CONTAINING ALCOHOL: NEVER
HOW MANY STANDARD DRINKS CONTAINING ALCOHOL DO YOU HAVE ON A TYPICAL DAY: PATIENT DOES NOT DRINK

## 2023-04-29 ASSESSMENT — PAIN DESCRIPTION - ORIENTATION
ORIENTATION: RIGHT
ORIENTATION: RIGHT

## 2023-04-29 ASSESSMENT — PAIN SCALES - GENERAL
PAINLEVEL_OUTOF10: 10
PAINLEVEL_OUTOF10: 8

## 2023-04-29 ASSESSMENT — PAIN - FUNCTIONAL ASSESSMENT: PAIN_FUNCTIONAL_ASSESSMENT: 0-10

## 2023-04-29 ASSESSMENT — PAIN DESCRIPTION - DESCRIPTORS
DESCRIPTORS: ACHING;DISCOMFORT;SHARP
DESCRIPTORS: DISCOMFORT;SORE

## 2023-04-29 ASSESSMENT — PAIN DESCRIPTION - ONSET: ONSET: ON-GOING

## 2023-04-29 ASSESSMENT — PAIN DESCRIPTION - LOCATION
LOCATION: KNEE
LOCATION: KNEE

## 2023-04-29 ASSESSMENT — PAIN DESCRIPTION - PAIN TYPE: TYPE: ACUTE PAIN

## 2023-04-29 NOTE — ED NOTES
Patient brought in own crutches to bedside      Godfrey Ziegler, [address] [place]  [id number]

## 2023-04-29 NOTE — ED PROVIDER NOTES
symptoms of compartment syndrome such as worsening pain, numbness, tingling, weakness in affected extremity, increased swelling, worsening mobility/inability to wiggle toes, RICE concepts, and ace wrap application were discussed. Patient will be discharged home with ibuprofen for analgesia. FINAL IMPRESSION      1. Sprain of lateral collateral ligament of right knee, initial encounter    2. Acute pain of right knee          DISPOSITION/PLAN     DISPOSITION Decision To Discharge 04/29/2023 02:30:03 PM  Discharge to home. Patient condition is good. PATIENT REFERRED TO:  Stacey Simon, [place] [id number] [phone]    Schedule an appointment as soon as possible for a visit in 1 week  As needed      DISCHARGE MEDICATIONS:  Discharge Medication List as of 4/29/2023  2:35 PM        START taking these medications    Details   ibuprofen (ADVIL;MOTRIN) 600 MG tablet Take 1 tablet by mouth 4 times daily as needed for Pain, Disp-40 tablet, R-0Normal             DISCONTINUED MEDICATIONS:  Discharge Medication List as of 4/29/2023  2:35 PM          PLAN OF CARE & COUNSELING:  EDDIE Lundy CNP reviewed today's visit with the patient. This included the differential, results and plan of care in addition to providing specific details for the plan of care and counseling regarding the diagnosis and prognosis and they are agreeable with the plan. All results reviewed with patient. and all questions answered. The patient expressed understanding. I emphasized the importance of follow-up with the physician I referred them to in the timeframe recommended. I discussed with the patient emergent symptoms and the need to immediately return to the ER for any new or worsening symptoms as discussed above. Written information was included in their discharge instructions.  Additional verbal discharge instructions were also given and discussed with the patient to supplement those

## 2023-12-05 ENCOUNTER — HOSPITAL ENCOUNTER (EMERGENCY)
Age: 33
Discharge: HOME | End: 2023-12-05
Payer: MEDICAID

## 2023-12-05 VITALS
WEIGHT: 155 LBS | DIASTOLIC BLOOD PRESSURE: 84 MMHG | OXYGEN SATURATION: 96 % | RESPIRATION RATE: 18 BRPM | TEMPERATURE: 99.5 F | SYSTOLIC BLOOD PRESSURE: 120 MMHG | BODY MASS INDEX: 26.61 KG/M2 | HEART RATE: 94 BPM

## 2023-12-05 DIAGNOSIS — U07.1 COVID: Primary | ICD-10-CM

## 2023-12-05 LAB
FLUAV RNA RESP QL NAA+PROBE: NOT DETECTED
FLUBV RNA RESP QL NAA+PROBE: NOT DETECTED
SARS-COV-2 RNA RESP QL NAA+PROBE: DETECTED
SOURCE: ABNORMAL
SPECIMEN DESCRIPTION: ABNORMAL
SPECIMEN SOURCE: NORMAL
STREP A, MOLECULAR: NEGATIVE

## 2023-12-05 PROCEDURE — 87636 SARSCOV2 & INF A&B AMP PRB: CPT

## 2023-12-05 PROCEDURE — 6370000000 HC RX 637 (ALT 250 FOR IP): Performed by: NURSE PRACTITIONER

## 2023-12-05 PROCEDURE — 99283 EMERGENCY DEPT VISIT LOW MDM: CPT

## 2023-12-05 PROCEDURE — 87651 STREP A DNA AMP PROBE: CPT

## 2023-12-05 RX ORDER — ZINC GLUCONATE 50 MG
50 TABLET ORAL DAILY
Qty: 30 TABLET | Refills: 0 | Status: SHIPPED | OUTPATIENT
Start: 2023-12-05

## 2023-12-05 RX ORDER — IBUPROFEN 400 MG/1
400 TABLET ORAL ONCE
Status: COMPLETED | OUTPATIENT
Start: 2023-12-05 | End: 2023-12-05

## 2023-12-05 RX ORDER — BROMPHENIRAMINE MALEATE, PSEUDOEPHEDRINE HYDROCHLORIDE, AND DEXTROMETHORPHAN HYDROBROMIDE 2; 30; 10 MG/5ML; MG/5ML; MG/5ML
5 SYRUP ORAL 4 TIMES DAILY PRN
Qty: 118 ML | Refills: 0 | Status: SHIPPED | OUTPATIENT
Start: 2023-12-05

## 2023-12-05 RX ORDER — ASCORBIC ACID 500 MG
500 TABLET ORAL DAILY
Qty: 30 TABLET | Refills: 0 | Status: SHIPPED | OUTPATIENT
Start: 2023-12-05

## 2023-12-05 RX ORDER — ACETAMINOPHEN 500 MG
1000 TABLET ORAL ONCE
Status: COMPLETED | OUTPATIENT
Start: 2023-12-05 | End: 2023-12-05

## 2023-12-05 RX ADMIN — ACETAMINOPHEN 1000 MG: 500 TABLET ORAL at 13:37

## 2023-12-05 RX ADMIN — IBUPROFEN 400 MG: 400 TABLET ORAL at 13:37

## 2023-12-05 ASSESSMENT — PAIN DESCRIPTION - DESCRIPTORS: DESCRIPTORS: ACHING;DISCOMFORT;SORE

## 2023-12-05 ASSESSMENT — PAIN SCALES - GENERAL: PAINLEVEL_OUTOF10: 10

## 2023-12-05 ASSESSMENT — PAIN DESCRIPTION - FREQUENCY: FREQUENCY: CONTINUOUS

## 2023-12-05 ASSESSMENT — PAIN DESCRIPTION - PAIN TYPE: TYPE: ACUTE PAIN

## 2023-12-05 ASSESSMENT — PAIN DESCRIPTION - LOCATION: LOCATION: GENERALIZED;THROAT

## 2023-12-05 ASSESSMENT — PAIN - FUNCTIONAL ASSESSMENT: PAIN_FUNCTIONAL_ASSESSMENT: 0-10

## 2023-12-05 ASSESSMENT — PAIN DESCRIPTION - ONSET: ONSET: ON-GOING

## 2023-12-05 NOTE — ED PROVIDER NOTES
[address] [facility]        Pt Name: Jacky Rojas  MRN: [id number]  [address] [facility] [place] 1990  Date of evaluation: 12/5/2023  Provider: EDDIE Alexander CNP  PCP: Angeline Aguirre DO  Note Started: 3:04 PM EST 12/5/23    CHIEF COMPLAINT       Chief Complaint   Patient presents with    URI     Fever, sore throat, coughing, aches started 3 days ago       HISTORY OF PRESENT ILLNESS: 1 or more Elements     History from : Patient    Limitations to history : None    Jacky Rojas is a 34 y.o. female who presents to the ed for uri symptoms for the last several days . Patient states that she has had cough congestion fever sore throat body aches for 3 days. She denies any nausea vomiting diarrhea shortness of breath or chest pain. Patient states that she has taken some Bromfed which she had leftover from a previous illness which has helped her symptoms. Patient states that she has not taken any Tylenol or ibuprofen. Patient states that she feels as though she has had a fever but has not checked it at home. Patient states that there has been no recent travel and states that she has had multiple sick contacts at work. Nursing Notes were all reviewed and agreed with or any disagreements were addressed in the HPI. REVIEW OF SYSTEMS :      Review of Systems   All other systems reviewed and are negative. Positives and Pertinent negatives as per HPI. SURGICAL HISTORY   History reviewed. No pertinent surgical history.     CURRENTMEDICATIONS       Previous Medications    ALBUTEROL SULFATE HFA (PROVENTIL HFA) 108 (90 BASE) MCG/ACT INHALER    Inhale 2 puffs into the lungs every 4 hours as needed for Wheezing or Shortness of Breath    AMPHETAMINE-DEXTROAMPHETAMINE (ADDERALL XR) 30 MG EXTENDED RELEASE CAPSULE        AMPHETAMINE-DEXTROAMPHETAMINE (ADDERALL) 20 MG TABLET        BUPROPION (WELLBUTRIN XL) 300 MG EXTENDED RELEASE TABLET

## 2024-03-14 ENCOUNTER — APPOINTMENT (OUTPATIENT)
Dept: GENERAL RADIOLOGY | Age: 34
End: 2024-03-14
Payer: MEDICAID

## 2024-03-14 ENCOUNTER — HOSPITAL ENCOUNTER (EMERGENCY)
Age: 34
Discharge: HOME | End: 2024-03-14
Payer: MEDICAID

## 2024-03-14 VITALS
WEIGHT: 160 LBS | DIASTOLIC BLOOD PRESSURE: 80 MMHG | OXYGEN SATURATION: 99 % | SYSTOLIC BLOOD PRESSURE: 113 MMHG | HEART RATE: 80 BPM | BODY MASS INDEX: 27.31 KG/M2 | HEIGHT: 64 IN | TEMPERATURE: 98 F | RESPIRATION RATE: 16 BRPM

## 2024-03-14 DIAGNOSIS — S82.831A CLOSED FRACTURE OF DISTAL END OF RIGHT FIBULA, UNSPECIFIED FRACTURE MORPHOLOGY, INITIAL ENCOUNTER: Primary | ICD-10-CM

## 2024-03-14 DIAGNOSIS — S80.01XA CONTUSION OF RIGHT KNEE, INITIAL ENCOUNTER: ICD-10-CM
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
## 2024-03-14 DIAGNOSIS — S60.211A CONTUSION OF RIGHT WRIST, INITIAL ENCOUNTER: ICD-10-CM

## 2024-03-14 PROCEDURE — 99283 EMERGENCY DEPT VISIT LOW MDM: CPT

## 2024-03-14 PROCEDURE — 73610 X-RAY EXAM OF ANKLE: CPT

## 2024-03-14 PROCEDURE — 73130 X-RAY EXAM OF HAND: CPT

## 2024-03-14 PROCEDURE — 29125 APPL SHORT ARM SPLINT STATIC: CPT

## 2024-03-14 PROCEDURE — 29515 APPLICATION SHORT LEG SPLINT: CPT

## 2024-03-14 PROCEDURE — 73110 X-RAY EXAM OF WRIST: CPT

## 2024-03-14 PROCEDURE — 6370000000 HC RX 637 (ALT 250 FOR IP): Performed by: NURSE PRACTITIONER

## 2024-03-14 PROCEDURE — 73630 X-RAY EXAM OF FOOT: CPT

## 2024-03-14 PROCEDURE — 73560 X-RAY EXAM OF KNEE 1 OR 2: CPT

## 2024-03-14 RX ORDER — HYDROCODONE BITARTRATE AND ACETAMINOPHEN 5; 325 MG/1; MG/1
1 TABLET ORAL EVERY 8 HOURS PRN
Qty: 9 TABLET | Refills: 0 | Status: SHIPPED | OUTPATIENT
Start: 2024-03-14 | End: 2024-03-17

## 2024-03-14 RX ORDER — IBUPROFEN 400 MG/1
400 TABLET ORAL ONCE
Status: COMPLETED | OUTPATIENT
Start: 2024-03-14 | End: 2024-03-14

## 2024-03-14 RX ORDER — HYDROXYZINE HYDROCHLORIDE 25 MG/1
25 TABLET, FILM COATED ORAL 3 TIMES DAILY PRN
COMMUNITY

## 2024-03-14 RX ORDER — IBUPROFEN 600 MG/1
600 TABLET ORAL 3 TIMES DAILY PRN
Qty: 30 TABLET | Refills: 0 | Status: SHIPPED | OUTPATIENT
Start: 2024-03-14

## 2024-03-14 RX ADMIN — IBUPROFEN 400 MG: 400 TABLET, FILM COATED ORAL at 17:17

## 2024-03-14 ASSESSMENT — PAIN SCALES - GENERAL
PAINLEVEL_OUTOF10: 7
PAINLEVEL_OUTOF10: 10
PAINLEVEL_OUTOF10: 6

## 2024-03-14 ASSESSMENT — PAIN DESCRIPTION - DESCRIPTORS
DESCRIPTORS: SHARP;PRESSURE;ACHING
DESCRIPTORS: ACHING;DISCOMFORT;THROBBING
DESCRIPTORS: ACHING

## 2024-03-14 ASSESSMENT — PAIN DESCRIPTION - LOCATION
LOCATION: ANKLE
LOCATION: WRIST
LOCATION: LEG

## 2024-03-14 ASSESSMENT — PAIN DESCRIPTION - ORIENTATION
ORIENTATION: RIGHT

## 2024-03-14 ASSESSMENT — PAIN - FUNCTIONAL ASSESSMENT: PAIN_FUNCTIONAL_ASSESSMENT: 0-10

## 2024-03-14 ASSESSMENT — PAIN DESCRIPTION - PAIN TYPE: TYPE: ACUTE PAIN

## 2024-03-15 ENCOUNTER — TELEPHONE (OUTPATIENT)
Dept: ORTHOPEDIC SURGERY | Age: 34
End: 2024-03-15

## 2024-03-15 NOTE — TELEPHONE ENCOUNTER
Patient called office requesting appointment for ED follow up.    ED visit date: 3/14/24    Injury:Closed fracture of distal end of right fibula, fracture morphology    Routed to providers for recommendations.    No future appointments.

## 2024-03-16 NOTE — ED PROVIDER NOTES
Independent SANDY Visit.          [facility] EMERGENCY DEPARTMENT  EMERGENCY DEPARTMENT ENCOUNTER        Pt Name: Sarahi Brunson  MRN: [id number]  Birthdate 1990  Date of evaluation: 3/14/2024  Provider: EDDIE Felipe - CNP  PCP: [facility]  Note Started: 10:22 PM EDT 3/15/24    CHIEF COMPLAINT       Chief Complaint   Patient presents with    Ankle Injury     Pt walking in muddy parking lot a week ago, having right ankle pain and swelling    Wrist Injury     Right wrist pain since falling       HISTORY OF PRESENT ILLNESS: 1 or more Elements     History from : Patient    Limitations to history : None    Sarahi Brunson is a 33 y.o. female who presents to the ED for an injury.  Patient states that she was trying to jump over a body puddle approximately 1 week ago she states that she rolled her right ankle and foot and continues to have pain and bruising.  She states that she also hurt her right wrist and hand.  Patient states that she did not hit her head lose consciousness.  Patient states that she has not tried or taken anything else for her symptoms.    Nursing Notes were all reviewed and agreed with or any disagreements were addressed in the HPI.    REVIEW OF SYSTEMS :      Review of Systems   All other systems reviewed and are negative.      Positives and Pertinent negatives as per HPI.     SURGICAL HISTORY   History reviewed. No pertinent surgical history.    CURRENTMEDICATIONS       Discharge Medication List as of 3/14/2024  7:36 PM        CONTINUE these medications which have NOT CHANGED    Details   hydrOXYzine HCl (ATARAX) 25 MG tablet Take 1 tablet by mouth 3 times daily as needed for AnxietyHistorical Med      amphetamine-dextroamphetamine (ADDERALL XR) 30 MG extended release capsule Historical Med      amphetamine-dextroamphetamine (ADDERALL) 20 MG tablet Historical Med      buPROPion (WELLBUTRIN XL) 300 MG extended release tablet Historical

## 2024-03-19 ENCOUNTER — OFFICE VISIT (OUTPATIENT)
Dept: ORTHOPEDIC SURGERY | Age: 34
End: 2024-03-19
Payer: MEDICAID

## 2024-03-19 DIAGNOSIS — S82.831A OTHER CLOSED FRACTURE OF DISTAL END OF RIGHT FIBULA, INITIAL ENCOUNTER: Primary | ICD-10-CM

## 2024-03-19 PROCEDURE — G8484 FLU IMMUNIZE NO ADMIN: HCPCS | Performed by: PHYSICIAN ASSISTANT

## 2024-03-19 PROCEDURE — G8419 CALC BMI OUT NRM PARAM NOF/U: HCPCS | Performed by: PHYSICIAN ASSISTANT

## 2024-03-19 PROCEDURE — 99203 OFFICE O/P NEW LOW 30 MIN: CPT | Performed by: PHYSICIAN ASSISTANT

## 2024-03-19 PROCEDURE — 1036F TOBACCO NON-USER: CPT | Performed by: PHYSICIAN ASSISTANT

## 2024-03-19 PROCEDURE — G8427 DOCREV CUR MEDS BY ELIG CLIN: HCPCS | Performed by: PHYSICIAN ASSISTANT

## 2024-03-19 RX ORDER — HYDROCODONE BITARTRATE AND ACETAMINOPHEN 5; 325 MG/1; MG/1
1 TABLET ORAL 2 TIMES DAILY PRN
Qty: 14 TABLET | Refills: 0 | Status: SHIPPED | OUTPATIENT
Start: 2024-03-19 | End: 2024-03-26

## 2024-03-19 NOTE — PATIENT INSTRUCTIONS
Wearing a Cast: Care Instructions  Your Care Instructions     A cast protects a broken bone or other injury while it heals. Your cast is made of plaster. After a cast is put on, you can't remove it yourself. Your doctor or a technician will take it off.  Follow-up care is a key part of your treatment and safety. Be sure to make and go to all appointments, and call your doctor if you are having problems. It's also a good idea to know your test results and keep a list of the medicines you take.  How can you care for yourself at home?  General care  Follow your doctor's instructions for when you can start using the limb that has the cast. Plaster casts may take several days before they are hard enough to protect the injured limb.  When it's okay to put weight on your leg or foot cast, don't stand or walk on it unless it's designed for walking.  Prop up the injured arm or leg on a pillow anytime you sit or lie down during the first 3 days. Try to keep it above the level of your heart. This will help reduce swelling.  Put ice or a cold pack on your cast for 10 to 20 minutes at a time. Try to do this every 1 to 2 hours for the next 3 days (when you are awake). Put a thin cloth between the ice and your cast. Keep the cast dry.  Be safe with medicines. Read and follow all instructions on the label.  If the doctor gave you a prescription medicine for pain, take it as prescribed.  If you are not taking a prescription pain medicine, ask your doctor if you can take an over-the-counter medicine.  Do exercises as instructed by your doctor or physical therapist. These exercises will help keep your muscles strong and your joints flexible while you heal.  Wiggle your fingers or toes on the injured arm or leg often. This helps reduce swelling and stiffness.  Water and your cast  Keep your cast completely dry. The plaster will start to break down if it gets wet.  Use a bag or tape a sheet of plastic to cover your cast when you take a

## 2024-03-19 NOTE — PROGRESS NOTES
Orthopaedic H&P Note    Sarahi Brunson is a 33 y.o. female, her YOB: 1990 with the following history as recorded in [employer]:      There are no problems to display for this patient.    Current Outpatient Medications   Medication Sig Dispense Refill    HYDROcodone-acetaminophen (NORCO) 5-325 MG per tablet Take 1 tablet by mouth 2 times daily as needed for Pain for up to 7 days. Intended supply: 7 days. Take lowest dose possible to manage pain Max Daily Amount: 2 tablets 14 tablet 0    Misc. Devices MISC 1 each by Does not apply route once for 1 dose One shower chair 1 each 0    Misc. Devices MISC 1 each by Does not apply route once for 1 dose One walker 1 each 0    hydrOXYzine HCl (ATARAX) 25 MG tablet Take 1 tablet by mouth 3 times daily as needed for Anxiety      ibuprofen (ADVIL;MOTRIN) 600 MG tablet Take 1 tablet by mouth 3 times daily as needed for Pain 30 tablet 0    amphetamine-dextroamphetamine (ADDERALL XR) 30 MG extended release capsule       amphetamine-dextroamphetamine (ADDERALL) 20 MG tablet       buPROPion (WELLBUTRIN XL) 300 MG extended release tablet       butalbital-APAP-caffeine (FIORICET) -40 MG CAPS per capsule Take 1 capsule by mouth every 6 hours as needed for Headaches 15 capsule 1    albuterol sulfate HFA (PROVENTIL HFA) 108 (90 Base) MCG/ACT inhaler Inhale 2 puffs into the lungs every 4 hours as needed for Wheezing or Shortness of Breath 18 g 0     No current facility-administered medications for this visit.     Allergies: Patient has no known allergies.  Past Medical History:   Diagnosis Date    Depression      History reviewed. No pertinent surgical history.  History reviewed. No pertinent family history.  Social History     Tobacco Use    Smoking status: Never    Smokeless tobacco: Never   Substance Use Topics    Alcohol use: Yes     Comment: social                             Chief Complaint   Patient presents with    Follow-Up from Hospital     Right fibula fx

## 2024-03-25 ENCOUNTER — TELEPHONE (OUTPATIENT)
Dept: ORTHOPEDIC SURGERY | Age: 34
End: 2024-03-25

## 2024-03-25 NOTE — TELEPHONE ENCOUNTER
Pt called and LVM requesting eligibility for a temporary handicap placard. Routed to providers for review.

## 2024-03-26 DIAGNOSIS — S82.831A OTHER CLOSED FRACTURE OF DISTAL END OF RIGHT FIBULA, INITIAL ENCOUNTER: ICD-10-CM

## 2024-03-26 RX ORDER — HYDROCODONE BITARTRATE AND ACETAMINOPHEN 5; 325 MG/1; MG/1
1 TABLET ORAL 2 TIMES DAILY PRN
Qty: 14 TABLET | Refills: 0 | Status: SHIPPED | OUTPATIENT
Start: 2024-03-26 | End: 2024-04-02

## 2024-03-26 NOTE — TELEPHONE ENCOUNTER
Patient called and LVM requesting refill of Hydrocodone sent to [facility] on [address]. Routing to provider for review.

## 2024-04-02 ENCOUNTER — TELEPHONE (OUTPATIENT)
Dept: ORTHOPEDIC SURGERY | Age: 34
End: 2024-04-02

## 2024-04-02 DIAGNOSIS — S82.831A OTHER CLOSED FRACTURE OF DISTAL END OF RIGHT FIBULA, INITIAL ENCOUNTER: ICD-10-CM

## 2024-04-02 RX ORDER — HYDROCODONE BITARTRATE AND ACETAMINOPHEN 5; 325 MG/1; MG/1
1 TABLET ORAL 2 TIMES DAILY PRN
Qty: 14 TABLET | Refills: 0 | Status: CANCELLED | OUTPATIENT
Start: 2024-04-02 | End: 2024-04-09

## 2024-04-02 NOTE — TELEPHONE ENCOUNTER
Patient called to request refill of pain med Norco.  Refill pended    Right fibula fx 3/14/24  Non-surgical management

## 2024-04-02 NOTE — TELEPHONE ENCOUNTER
Per note on 3/26/24 from Bert Galindo PA-C- that was the patients last refill on narcotic pain medication

## 2024-04-02 NOTE — TELEPHONE ENCOUNTER
LM on patient VM  Regarding response per [place] GALLEGOS.  No more refills.  OTC pain meds as directed on box label.

## 2024-04-03 NOTE — TELEPHONE ENCOUNTER
4/2/2024 5:06 p.m.  Received second VM from patient stating she received message regarding no further narcotic medications.     She states she forgot to relay to us that she \"fell 2 days ago in my bathroom and a small part of my cast broke off\".  Pain level has not increased since fall.   She stated hard to maneuver crutches on one foot.   Has Ibuprofen 600 mg at home, she will take 1 tab tid prn for pain  FU appt 4/9/2024

## 2024-04-03 NOTE — TELEPHONE ENCOUNTER
Called and scheduled patient for cast change tomorrow.    Future Appointments   Date Time Provider Department Center   4/4/2024  2:30 PM Tom Doty DO  [facility]   4/9/2024 10:45 AM [facility]

## 2024-04-09 ENCOUNTER — OFFICE VISIT (OUTPATIENT)
Dept: ORTHOPEDIC SURGERY | Age: 34
End: 2024-04-09
Payer: MEDICAID

## 2024-04-09 VITALS — HEIGHT: 64 IN | WEIGHT: 160 LBS | BODY MASS INDEX: 27.31 KG/M2

## 2024-04-09 DIAGNOSIS — S82.831A OTHER CLOSED FRACTURE OF DISTAL END OF RIGHT FIBULA, INITIAL ENCOUNTER: Primary | ICD-10-CM

## 2024-04-09 PROCEDURE — 99213 OFFICE O/P EST LOW 20 MIN: CPT | Performed by: ORTHOPAEDIC SURGERY

## 2024-04-09 PROCEDURE — G8419 CALC BMI OUT NRM PARAM NOF/U: HCPCS | Performed by: ORTHOPAEDIC SURGERY

## 2024-04-09 PROCEDURE — 1036F TOBACCO NON-USER: CPT | Performed by: ORTHOPAEDIC SURGERY

## 2024-04-09 PROCEDURE — G8427 DOCREV CUR MEDS BY ELIG CLIN: HCPCS | Performed by: ORTHOPAEDIC SURGERY

## 2024-04-22 ENCOUNTER — TELEPHONE (OUTPATIENT)
Dept: ORTHOPEDIC SURGERY | Age: 34
End: 2024-04-22

## 2024-04-22 RX ORDER — METHOCARBAMOL 750 MG/1
750 TABLET, FILM COATED ORAL 3 TIMES DAILY
Qty: 90 TABLET | Refills: 0 | Status: SHIPPED | OUTPATIENT
Start: 2024-04-22 | End: 2024-05-22

## 2024-04-22 NOTE — TELEPHONE ENCOUNTER
If patient is having calf pain, swelling, and tenderness she needs to go to the ED for an ultrasound to rule out DVT.     No additional narcotic pain medication can be provided at this time. Ibuprofen and tylenol for pain. Roxabin also sent to the patients pharmacy.

## 2024-04-22 NOTE — TELEPHONE ENCOUNTER
Pt called. States she is continuing to get pain up both sides of her leg and states she is getting \"Ty horses\" in her leg. She is asking for more pain medication. She states the Motrin isn't working. I instructed her on alternating Tylenol and Motrin. She is asking if she could get Robaxin to help with the muscle spasms.    ED visit date: 3/14/24     Injury:Closed fracture of distal end of right fibula, fracture morphology    LOV: 4/9/24    Future Appointments   Date Time Provider Department Center   5/7/2024 10:15 AM Tom Doty DO [facility]

## 2024-05-07 ENCOUNTER — OFFICE VISIT (OUTPATIENT)
Dept: ORTHOPEDIC SURGERY | Age: 34
End: 2024-05-07
Payer: MEDICAID

## 2024-05-07 DIAGNOSIS — S82.831A OTHER CLOSED FRACTURE OF DISTAL END OF RIGHT FIBULA, INITIAL ENCOUNTER: Primary | ICD-10-CM

## 2024-05-07 PROCEDURE — G8427 DOCREV CUR MEDS BY ELIG CLIN: HCPCS | Performed by: ORTHOPAEDIC SURGERY

## 2024-05-07 PROCEDURE — G8419 CALC BMI OUT NRM PARAM NOF/U: HCPCS | Performed by: ORTHOPAEDIC SURGERY

## 2024-05-07 PROCEDURE — 99213 OFFICE O/P EST LOW 20 MIN: CPT | Performed by: ORTHOPAEDIC SURGERY

## 2024-05-07 PROCEDURE — 1036F TOBACCO NON-USER: CPT | Performed by: ORTHOPAEDIC SURGERY

## 2024-05-07 NOTE — PROGRESS NOTES
Orthopaedic Clinic Note    Sarahi Brunson is a 33 y.o. female, her YOB: 1990 with the following history as recorded in [employer]:      There are no problems to display for this patient.    Current Outpatient Medications   Medication Sig Dispense Refill    methocarbamol (ROBAXIN-750) 750 MG tablet Take 1 tablet by mouth 3 times daily 90 tablet 0    hydrOXYzine HCl (ATARAX) 25 MG tablet Take 1 tablet by mouth 3 times daily as needed for Anxiety      ibuprofen (ADVIL;MOTRIN) 600 MG tablet Take 1 tablet by mouth 3 times daily as needed for Pain 30 tablet 0    amphetamine-dextroamphetamine (ADDERALL XR) 30 MG extended release capsule       amphetamine-dextroamphetamine (ADDERALL) 20 MG tablet       buPROPion (WELLBUTRIN XL) 300 MG extended release tablet       butalbital-APAP-caffeine (FIORICET) -40 MG CAPS per capsule Take 1 capsule by mouth every 6 hours as needed for Headaches 15 capsule 1    Misc. Devices MISC 1 each by Does not apply route once for 1 dose One shower chair 1 each 0    Misc. Devices MISC 1 each by Does not apply route once for 1 dose One walker 1 each 0    albuterol sulfate HFA (PROVENTIL HFA) 108 (90 Base) MCG/ACT inhaler Inhale 2 puffs into the lungs every 4 hours as needed for Wheezing or Shortness of Breath 18 g 0     No current facility-administered medications for this visit.     Allergies: Patient has no known allergies.  Past Medical History:   Diagnosis Date    Depression      History reviewed. No pertinent surgical history.  History reviewed. No pertinent family history.  Social History     Tobacco Use    Smoking status: Never    Smokeless tobacco: Never   Substance Use Topics    Alcohol use: Yes     Comment: social                             Chief Complaint   Patient presents with    Ankle Pain     Patient here today follow up from non-op right distal fib fx. Patient been compliant with NWB status. Minimal swelling and pain that comes and goes. Patient has

## 2024-07-16 ENCOUNTER — HOSPITAL ENCOUNTER (EMERGENCY)
Age: 34
Discharge: HOME | End: 2024-07-16
Payer: MEDICAID

## 2024-07-16 VITALS
RESPIRATION RATE: 16 BRPM | WEIGHT: 150 LBS | BODY MASS INDEX: 25.75 KG/M2 | OXYGEN SATURATION: 98 % | SYSTOLIC BLOOD PRESSURE: 106 MMHG | DIASTOLIC BLOOD PRESSURE: 72 MMHG | TEMPERATURE: 98.4 F | HEART RATE: 87 BPM

## 2024-07-16 DIAGNOSIS — J03.90 ACUTE TONSILLITIS, UNSPECIFIED ETIOLOGY: Primary | ICD-10-CM

## 2024-07-16 LAB
FLUAV RNA RESP QL NAA+PROBE: NOT DETECTED
FLUBV RNA RESP QL NAA+PROBE: NOT DETECTED
HETEROPH AB BLD QL IA: NEGATIVE
SARS-COV-2 RNA RESP QL NAA+PROBE: NOT DETECTED
SOURCE: NORMAL
SPECIMEN DESCRIPTION: NORMAL
SPECIMEN SOURCE: NORMAL
STREP A, MOLECULAR: NEGATIVE

## 2024-07-16 PROCEDURE — 87636 SARSCOV2 & INF A&B AMP PRB: CPT

## 2024-07-16 PROCEDURE — 87651 STREP A DNA AMP PROBE: CPT

## 2024-07-16 PROCEDURE — 99283 EMERGENCY DEPT VISIT LOW MDM: CPT

## 2024-07-16 PROCEDURE — 86308 HETEROPHILE ANTIBODY SCREEN: CPT

## 2024-07-16 RX ORDER — AMOXICILLIN 500 MG/1
500 CAPSULE ORAL 2 TIMES DAILY
Qty: 20 CAPSULE | Refills: 0 | Status: SHIPPED | OUTPATIENT
Start: 2024-07-16 | End: 2024-07-26

## 2024-07-16 RX ORDER — CETIRIZINE HYDROCHLORIDE 10 MG/1
10 TABLET ORAL DAILY
Qty: 30 TABLET | Refills: 0 | Status: SHIPPED | OUTPATIENT
Start: 2024-07-16

## 2024-07-16 ASSESSMENT — LIFESTYLE VARIABLES
HOW MANY STANDARD DRINKS CONTAINING ALCOHOL DO YOU HAVE ON A TYPICAL DAY: PATIENT DOES NOT DRINK
HOW OFTEN DO YOU HAVE A DRINK CONTAINING ALCOHOL: NEVER

## 2024-07-16 ASSESSMENT — PAIN DESCRIPTION - DESCRIPTORS
DESCRIPTORS: DISCOMFORT;TENDER;SORE
DESCRIPTORS: ACHING;SORE;SHARP

## 2024-07-16 ASSESSMENT — PAIN DESCRIPTION - LOCATION
LOCATION: THROAT
LOCATION: THROAT

## 2024-07-16 ASSESSMENT — PAIN DESCRIPTION - ONSET: ONSET: ON-GOING

## 2024-07-16 ASSESSMENT — PAIN DESCRIPTION - FREQUENCY: FREQUENCY: CONTINUOUS

## 2024-07-16 ASSESSMENT — PAIN SCALES - GENERAL
PAINLEVEL_OUTOF10: 10
PAINLEVEL_OUTOF10: 10

## 2024-07-16 ASSESSMENT — PAIN - FUNCTIONAL ASSESSMENT: PAIN_FUNCTIONAL_ASSESSMENT: 0-10

## 2024-07-16 ASSESSMENT — PAIN DESCRIPTION - PAIN TYPE: TYPE: ACUTE PAIN

## 2024-07-16 NOTE — ED PROVIDER NOTES
Independent SANDY Visit.        [facility]  Department of Emergency Medicine   ED  Encounter Note  Admit Date/RoomTime: 2024  4:05 PM  ED Room: [place] [facility]      NAME: Sarahi Brunson  : 1990  MRN: [id number]     Chief Complaint:  Pharyngitis (Sore throat mainly at night)    History of Present Illness      Sarahi Brunson is a 33 y.o. old female who presents to the emergency department by private vehicle, for intermittent episodes of central sore throat onset 1 week ago, states that she initially developed a sore throat that went away, over the last 2 to 3 days the sore throat has become more persistent, seems to be worse at night.  No fevers, no difficulty breathing, difficulty swallowing, drooling, trismus.  Has not used anything over-the-counter for symptoms.               Exposed To:  Streptococcus: no.                              Infectious Mononucleosis:  unknown.        Symptoms:  Pain:  mild.                            Muffled Voice:  No.                            Hoarse:  No.                            Difficulty with Secretions:  No.    Centor Score (MODIFIED) For Strep Pharyngitis:    Age 3-14 Years   no (0)     Age >45 Years   NO     Exudate or Swelling on Tonsils   no (0)     Tender/Swollen Anterior Cervical Nodes   yes (1)     Fever? (T > 38°C, 100.4°F)   no (0)     Absence of Cough   no (0)   TOTAL POINTS   1   Score of Zero = Probability of Strep Pharyngitis: 1% - 2.5%.,   No further testing nor antibiotics.  Score of 1 = Probability of Strep Pharyngitis: 5% - 10%.,   No further testing nor antibiotics.  Score of 2 = Probability of Strep Phar: 11% - 17%.   Culture/test all, ATB's only for positive culture results.  Score of 3 = Probability of Strep Phar: 28% - 35%.   Culture/test all, ATB's only for positive culture results  Score of 4 or 5 = Probability of Strep Pharyngitis: 51% - 53%.     Treat empirically with antibiotics.    ROS   Pertinent positives and

## 2024-07-16 NOTE — DISCHARGE INSTRUCTIONS
Your COVID, influenza, strep, mono testing was all negative.  Will still treat for tonsillitis with amoxicillin twice daily for 10 days, Cepacol sore throat spray as needed for sore throat.  Start Zyrtec once daily as an antihistamine.  If symptoms not improved please follow-up with ENT.

## 2024-08-05 NOTE — PROGRESS NOTES
SUBJECTIVE: Sarahi Brunson is here for f/u evaluation for right distal fibula fracture which we have been conservatively.  Initial date of injury 3/14/2024.  Patient states initially she was walking around on the injury for 1 week prior to evaluation.  Radiographs demonstrated stress negative distal fibula fracture.  Been treating conservatively.  She has been in a short leg cast since her last visit which is tolerated well and is maintain nonweightbearing per her reports.  States she has mild pain over the medial lateral ankle which is improving.  No other interval issues or concerns. She has no paresthesias.  No other orthopedic complaints today.    Review of Systems   Constitutional: Negative for fever, chills, diaphoresis, appetite change and fatigue.   HENT: Negative for dental issues, hearing loss and tinnitus. Negative for congestion, sinus pressure, sneezing, sore throat. Negative for headache.  Eyes: Negative for visual disturbance, blurred and double vision. Negative for pain, discharge, redness and itching  Respiratory: Negative for cough, shortness of breath and wheezing.   Cardiovascular: Negative for chest pain, palpitations and leg swelling. No dyspnea on exertion   Gastrointestinal:   Negative for nausea, vomiting, abdominal pain, diarrhea, constipation  or black or bloody.  Hematologic\Lymphatic:  negative for bleeding, petechiae,   Genitourinary: Negative for hematuria and difficulty urinating.   Musculoskeletal: Negative for neck pain and stiffness. Mild for back pain, negative joint swelling and gait problem.   Skin: Negative for pallor, rash and wound.   Neurological: Negative for dizziness, tremors, seizures, weakness, light-headedness, no TIA or stroke symptoms. No numbness and headaches.   Psychiatric/Behavioral: Negative.     Physical Examination:   General appearance: alert, well appearing, and in no distress,  normal appearing weight  Mental status: alert, oriented to person, 
No

## 2024-08-07 ENCOUNTER — TELEPHONE (OUTPATIENT)
Dept: ORTHOPEDIC SURGERY | Age: 34
End: 2024-08-07

## 2024-08-07 NOTE — TELEPHONE ENCOUNTER
The patient is calling in for a sooner appointment. Per the office notes [facility] Is there anyway that you can clarify this and let me know.

## 2024-09-20 DIAGNOSIS — S82.831A OTHER CLOSED FRACTURE OF DISTAL END OF RIGHT FIBULA, INITIAL ENCOUNTER: Primary | ICD-10-CM

## 2024-09-26 ENCOUNTER — OFFICE VISIT (OUTPATIENT)
Dept: ORTHOPEDIC SURGERY | Age: 34
End: 2024-09-26
Payer: MEDICAID

## 2024-09-26 ENCOUNTER — HOSPITAL ENCOUNTER (OUTPATIENT)
Dept: GENERAL RADIOLOGY | Age: 34
Discharge: HOME | End: 2024-09-28
Payer: MEDICAID

## 2024-09-26 DIAGNOSIS — S82.831A OTHER CLOSED FRACTURE OF DISTAL END OF RIGHT FIBULA, INITIAL ENCOUNTER: Primary | ICD-10-CM

## 2024-09-26 DIAGNOSIS — S82.831A OTHER CLOSED FRACTURE OF DISTAL END OF RIGHT FIBULA, INITIAL ENCOUNTER: ICD-10-CM

## 2024-09-26 PROCEDURE — 1036F TOBACCO NON-USER: CPT | Performed by: PHYSICIAN ASSISTANT

## 2024-09-26 PROCEDURE — G8419 CALC BMI OUT NRM PARAM NOF/U: HCPCS | Performed by: PHYSICIAN ASSISTANT

## 2024-09-26 PROCEDURE — G8427 DOCREV CUR MEDS BY ELIG CLIN: HCPCS | Performed by: PHYSICIAN ASSISTANT

## 2024-09-26 PROCEDURE — 73610 X-RAY EXAM OF ANKLE: CPT

## 2024-09-26 PROCEDURE — 99213 OFFICE O/P EST LOW 20 MIN: CPT | Performed by: PHYSICIAN ASSISTANT

## 2024-09-26 PROCEDURE — 99212 OFFICE O/P EST SF 10 MIN: CPT
